# Patient Record
Sex: MALE | Race: WHITE | NOT HISPANIC OR LATINO | URBAN - METROPOLITAN AREA
[De-identification: names, ages, dates, MRNs, and addresses within clinical notes are randomized per-mention and may not be internally consistent; named-entity substitution may affect disease eponyms.]

---

## 2017-03-24 ENCOUNTER — HOSPITAL ENCOUNTER (OUTPATIENT)
Dept: RADIOLOGY | Facility: MEDICAL CENTER | Age: 37
End: 2017-03-24
Attending: INTERNAL MEDICINE

## 2017-03-24 DIAGNOSIS — A69.20 LYME DISEASE: ICD-10-CM

## 2017-03-24 PROCEDURE — 76937 US GUIDE VASCULAR ACCESS: CPT

## 2017-03-24 PROCEDURE — 36569 INSJ PICC 5 YR+ W/O IMAGING: CPT

## 2017-03-24 NOTE — PROGRESS NOTES
PICC Insertion Final 3CG    Consents confirmed, vessel patency confirmed with ultrasound. Risks and benefits of procedure explained to patient/family and education regarding central line associated bloodstream infections provided. Questions answered.     PICC placed in RUE per MD order with ultrasound guidance. 4 Fr, SINGLE lumen PICC placed in BASILIC vein after ONE attempt(s). 1 cc's of 1% lidocaine injected intradermally, 21 gauge microintroducer needle and modified Seldinger technique used. 45 cm catheter inserted with good blood return. Secured at 2cm marker. Each lumen flushed without resistance with 10 mL 0.9% normal saline. PICC line secured with Biopatch and Tegaderm.    PICC placement is confirmed by nurse using 3CG technology. PICC line is appropriate for use at this time. Pt tolerated procedure WELL.  Patient condition relayed to unit RN or ordering physician via this post procedure note in the EMR.     [employer] Power PICC ref # [id number], Lot # [id number]    All guidewires removed, discharge instructions reviewed with patient, handout given.  RUE arm circumference at 9 cm britton proximal to AC:  35 cm.